# Patient Record
Sex: FEMALE | Race: BLACK OR AFRICAN AMERICAN | NOT HISPANIC OR LATINO | URBAN - METROPOLITAN AREA
[De-identification: names, ages, dates, MRNs, and addresses within clinical notes are randomized per-mention and may not be internally consistent; named-entity substitution may affect disease eponyms.]

---

## 2020-06-25 ENCOUNTER — LAB OUTSIDE AN ENCOUNTER (OUTPATIENT)
Dept: URBAN - METROPOLITAN AREA CLINIC 70 | Facility: CLINIC | Age: 40
End: 2020-06-25

## 2020-06-25 ENCOUNTER — OFFICE VISIT (OUTPATIENT)
Dept: URBAN - METROPOLITAN AREA CLINIC 70 | Facility: CLINIC | Age: 40
End: 2020-06-25
Payer: OTHER GOVERNMENT

## 2020-06-25 DIAGNOSIS — R14.0 BLOATING: ICD-10-CM

## 2020-06-25 DIAGNOSIS — K58.1 IRRITABLE BOWEL SYNDROME WITH CONSTIPATION: ICD-10-CM

## 2020-06-25 DIAGNOSIS — R13.10 DYSPHAGIA, UNSPECIFIED TYPE: ICD-10-CM

## 2020-06-25 DIAGNOSIS — R10.13 EPIGASTRIC ABDOMINAL PAIN: ICD-10-CM

## 2020-06-25 PROCEDURE — 1036F TOBACCO NON-USER: CPT | Performed by: NURSE PRACTITIONER

## 2020-06-25 PROCEDURE — G8420 CALC BMI NORM PARAMETERS: HCPCS | Performed by: NURSE PRACTITIONER

## 2020-06-25 PROCEDURE — G8427 DOCREV CUR MEDS BY ELIG CLIN: HCPCS | Performed by: NURSE PRACTITIONER

## 2020-06-25 PROCEDURE — 99213 OFFICE O/P EST LOW 20 MIN: CPT | Performed by: NURSE PRACTITIONER

## 2020-06-25 NOTE — HPI-TODAY'S VISIT:
C/o of a "pulsing" sensation that has been intermittent for years.  It appears to be worse after eating and lasts mins before resolving on its on.  Denies use of medication to help alleivate this.  May feel as if her heart is racing with this sensation.   has undergone cardiology evaluation in regards to this about 1-2 years ago with negative findings.   States about 2x a week, feels that her food is stopping in the epigastric region. This too resolves on its on.  Denies increase in frequency.     Has a hx of IBS-C.  Was last seen in [place] office 05/2018 for similar complaints.  Trial of Linzess, Trulance and Trazadone discussed.  Defecation occurs about qod but she fails to feel emptied.  Takes Flax seed oil or hot tea.  She has a hx of anxiety but denies use of medication for this as well.

## 2020-06-25 NOTE — PHYSICAL EXAM CHEST:
chest wall non-tender,  no masses present, breathing is unlabored without accessory muscle use, normal breath sounds

## 2020-06-25 NOTE — PHYSICAL EXAM GASTROINTESTINAL
Abdomen , soft, epigastric, nondistended , no guarding or rigidity , no masses palpable , normal bowel sounds , Liver and Spleen , no hepatomegaly present , no hepatosplenomegaly , liver nontender , spleen not palpable

## 2020-07-09 ENCOUNTER — CLAIMS CREATED FROM THE CLAIM WINDOW (OUTPATIENT)
Dept: URBAN - METROPOLITAN AREA CLINIC 4 | Facility: CLINIC | Age: 40
End: 2020-07-09
Payer: OTHER GOVERNMENT

## 2020-07-09 ENCOUNTER — OFFICE VISIT (OUTPATIENT)
Dept: URBAN - METROPOLITAN AREA SURGERY CENTER 24 | Facility: SURGERY CENTER | Age: 40
End: 2020-07-09
Payer: OTHER GOVERNMENT

## 2020-07-09 DIAGNOSIS — B96.81 HELICOBACTER PYLORI [H. PYLORI] AS THE CAUSE OF DISEASES CLASSIFIED ELSEWHERE: ICD-10-CM

## 2020-07-09 DIAGNOSIS — K29.60 ADENOPAPILLOMATOSIS GASTRICA: ICD-10-CM

## 2020-07-09 DIAGNOSIS — K21.9 ACID REFLUX: ICD-10-CM

## 2020-07-09 DIAGNOSIS — K21.0 GASTRO-ESOPHAGEAL REFLUX DISEASE WITH ESOPHAGITIS: ICD-10-CM

## 2020-07-09 DIAGNOSIS — B96.81 BACTERIAL INFECTION DUE TO H. PYLORI: ICD-10-CM

## 2020-07-09 PROCEDURE — 88342 IMHCHEM/IMCYTCHM 1ST ANTB: CPT | Performed by: PATHOLOGY

## 2020-07-09 PROCEDURE — 88305 TISSUE EXAM BY PATHOLOGIST: CPT | Performed by: PATHOLOGY

## 2020-07-09 PROCEDURE — 43239 EGD BIOPSY SINGLE/MULTIPLE: CPT | Performed by: INTERNAL MEDICINE

## 2020-07-09 PROCEDURE — G8907 PT DOC NO EVENTS ON DISCHARG: HCPCS | Performed by: INTERNAL MEDICINE

## 2020-07-10 ENCOUNTER — TELEPHONE ENCOUNTER (OUTPATIENT)
Dept: URBAN - METROPOLITAN AREA CLINIC 92 | Facility: CLINIC | Age: 40
End: 2020-07-10

## 2020-07-10 RX ORDER — SUCRALFATE 1 G/1
1 TABLET ON AN EMPTY STOMACH TABLET ORAL TWICE A DAY
Qty: 60 TABLET | Refills: 2 | OUTPATIENT
Start: 2020-07-10 | End: 2020-10-08

## 2020-07-10 RX ORDER — OMEPRAZOLE 40 MG/1
1 CAPSULE 30 MINUTES BEFORE MORNING MEAL CAPSULE, DELAYED RELEASE ORAL ONCE A DAY
Qty: 60 | Refills: 1 | OUTPATIENT
Start: 2020-07-10

## 2020-07-10 RX ORDER — SUCRALFATE 1 G/1
1 TABLET ON AN EMPTY STOMACH TABLET ORAL TWICE A DAY
Qty: 120 | Refills: 1 | OUTPATIENT
Start: 2020-07-10 | End: 2020-11-06

## 2021-04-20 ENCOUNTER — TELEPHONE ENCOUNTER (OUTPATIENT)
Dept: URBAN - METROPOLITAN AREA CLINIC 23 | Facility: CLINIC | Age: 41
End: 2021-04-20

## 2021-05-14 ENCOUNTER — OFFICE VISIT (OUTPATIENT)
Dept: URBAN - METROPOLITAN AREA CLINIC 70 | Facility: CLINIC | Age: 41
End: 2021-05-14

## 2021-05-14 RX ORDER — OMEPRAZOLE 40 MG/1
1 CAPSULE 30 MINUTES BEFORE MORNING MEAL CAPSULE, DELAYED RELEASE ORAL ONCE A DAY
Qty: 60 | Refills: 1 | Status: ACTIVE | COMMUNITY
Start: 2020-07-10

## 2024-01-31 ENCOUNTER — OFFICE VISIT (OUTPATIENT)
Dept: URBAN - METROPOLITAN AREA CLINIC 88 | Facility: CLINIC | Age: 44
End: 2024-01-31

## 2024-01-31 ENCOUNTER — LAB OUTSIDE AN ENCOUNTER (OUTPATIENT)
Dept: URBAN - METROPOLITAN AREA CLINIC 88 | Facility: CLINIC | Age: 44
End: 2024-01-31

## 2024-01-31 ENCOUNTER — OFFICE VISIT (OUTPATIENT)
Dept: URBAN - METROPOLITAN AREA CLINIC 88 | Facility: CLINIC | Age: 44
End: 2024-01-31
Payer: OTHER GOVERNMENT

## 2024-01-31 VITALS
BODY MASS INDEX: 25.02 KG/M2 | OXYGEN SATURATION: 99 % | DIASTOLIC BLOOD PRESSURE: 72 MMHG | TEMPERATURE: 97.7 F | SYSTOLIC BLOOD PRESSURE: 103 MMHG | HEART RATE: 109 BPM | HEIGHT: 65 IN | WEIGHT: 150.2 LBS

## 2024-01-31 DIAGNOSIS — K21.00 GASTROESOPHAGEAL REFLUX DISEASE WITH ESOPHAGITIS WITHOUT HEMORRHAGE: ICD-10-CM

## 2024-01-31 DIAGNOSIS — R14.0 POSTPRANDIAL BLOATING: ICD-10-CM

## 2024-01-31 DIAGNOSIS — K59.09 CHANGE IN BOWEL MOVEMENTS INTERMITTENT CONSTIPATION. URGENCY IN THE MORNING.: ICD-10-CM

## 2024-01-31 PROBLEM — 35298007: Status: ACTIVE | Noted: 2024-01-31

## 2024-01-31 PROBLEM — 266433003: Status: ACTIVE | Noted: 2024-01-31

## 2024-01-31 PROCEDURE — 99204 OFFICE O/P NEW MOD 45 MIN: CPT | Performed by: NURSE PRACTITIONER

## 2024-01-31 RX ORDER — OMEPRAZOLE 40 MG/1
1 CAPSULE 30 MINUTES BEFORE MORNING MEAL CAPSULE, DELAYED RELEASE ORAL ONCE A DAY
Qty: 60 | Refills: 1 | Status: ACTIVE | COMMUNITY
Start: 2020-07-10

## 2024-01-31 RX ORDER — PANTOPRAZOLE SODIUM 40 MG/1
1 TABLET TABLET, DELAYED RELEASE ORAL
Qty: 90 | Refills: 1 | OUTPATIENT
Start: 2024-01-31

## 2024-01-31 RX ORDER — OMEPRAZOLE 40 MG/1
1 CAPSULE 30 MINUTES BEFORE MORNING MEAL CAPSULE, DELAYED RELEASE ORAL ONCE A DAY
Qty: 60 | Refills: 1 | Status: DISCONTINUED | COMMUNITY
Start: 2020-07-10

## 2024-01-31 NOTE — HPI-TODAY'S VISIT:
43 y.o. presenting for evaluation of various GI complaints.  Currently c/o epigastric abdominal pain, constipation and gas/bloating that been an issue for years.  Feels her symptoms have increased over the last year.  Eating leads to early satiety, abdominal fullness and senation of food stopping in epigastric region.  Occasional nausea may occur.  Unable to identify specific dietary triggers.  Sensation lasts for minutes to hours.  Belching may alleviate this discomfort slightly.  Last EGD in 2020 revealed signs of reflux esophagitis and h. pylori gastritis.  Denies use of antacid therapy or symptoms of reflux occuring.    Voices long hx of constipation.  Typically defecation occurs once every 2-3 days.  Stools range from formed to loose.  May not experience a complete sense of evacuation of stool with every BM.  LLQ discomfort occurs and improves with defecation.

## 2024-01-31 NOTE — PHYSICAL EXAM GASTROINTESTINAL
Abdomen , soft, LLQ tenderness, nondistended , no guarding or rigidity , no masses palpable , normal bowel sounds , Liver and Spleen: no hepatosplenomegaly

## 2024-02-15 LAB — H PYLORI BREATH TEST: NEGATIVE

## 2024-03-14 ENCOUNTER — OV EP (OUTPATIENT)
Dept: URBAN - METROPOLITAN AREA CLINIC 88 | Facility: CLINIC | Age: 44
End: 2024-03-14

## 2024-03-14 RX ORDER — OMEPRAZOLE 40 MG/1
1 CAPSULE 30 MINUTES BEFORE MORNING MEAL CAPSULE, DELAYED RELEASE ORAL ONCE A DAY
Qty: 60 | Refills: 1 | Status: ACTIVE | COMMUNITY
Start: 2020-07-10

## 2024-03-14 RX ORDER — PANTOPRAZOLE SODIUM 40 MG/1
1 TABLET TABLET, DELAYED RELEASE ORAL
Qty: 90 | Refills: 1 | Status: ACTIVE | COMMUNITY
Start: 2024-01-31

## 2024-03-25 ENCOUNTER — OV EP (OUTPATIENT)
Dept: URBAN - METROPOLITAN AREA CLINIC 88 | Facility: CLINIC | Age: 44
End: 2024-03-25

## 2024-03-25 RX ORDER — OMEPRAZOLE 40 MG/1
1 CAPSULE 30 MINUTES BEFORE MORNING MEAL CAPSULE, DELAYED RELEASE ORAL ONCE A DAY
Qty: 60 | Refills: 1 | Status: ACTIVE | COMMUNITY
Start: 2020-07-10

## 2024-03-25 RX ORDER — PANTOPRAZOLE SODIUM 40 MG/1
1 TABLET TABLET, DELAYED RELEASE ORAL
Qty: 90 | Refills: 1 | Status: ACTIVE | COMMUNITY
Start: 2024-01-31

## 2024-04-10 ENCOUNTER — OV EP (OUTPATIENT)
Dept: URBAN - METROPOLITAN AREA CLINIC 88 | Facility: CLINIC | Age: 44
End: 2024-04-10
Payer: COMMERCIAL

## 2024-04-10 VITALS
WEIGHT: 152.2 LBS | SYSTOLIC BLOOD PRESSURE: 146 MMHG | TEMPERATURE: 97.9 F | DIASTOLIC BLOOD PRESSURE: 74 MMHG | BODY MASS INDEX: 25.36 KG/M2 | HEART RATE: 105 BPM | HEIGHT: 65 IN | OXYGEN SATURATION: 100 %

## 2024-04-10 DIAGNOSIS — K21.00 GASTROESOPHAGEAL REFLUX DISEASE WITH ESOPHAGITIS WITHOUT HEMORRHAGE: ICD-10-CM

## 2024-04-10 DIAGNOSIS — K59.00 COLONIC CONSTIPATION: ICD-10-CM

## 2024-04-10 DIAGNOSIS — R14.0 POSTPRANDIAL BLOATING: ICD-10-CM

## 2024-04-10 PROCEDURE — 99213 OFFICE O/P EST LOW 20 MIN: CPT | Performed by: NURSE PRACTITIONER

## 2024-04-10 RX ORDER — OMEPRAZOLE 40 MG/1
1 CAPSULE 30 MINUTES BEFORE MORNING MEAL CAPSULE, DELAYED RELEASE ORAL ONCE A DAY
Qty: 60 | Refills: 1 | Status: DISCONTINUED | COMMUNITY
Start: 2020-07-10

## 2024-04-10 RX ORDER — PANTOPRAZOLE SODIUM 40 MG/1
1 TABLET TABLET, DELAYED RELEASE ORAL
Qty: 90 | Refills: 1 | OUTPATIENT

## 2024-04-10 RX ORDER — PANTOPRAZOLE SODIUM 40 MG/1
1 TABLET TABLET, DELAYED RELEASE ORAL
Qty: 90 | Refills: 1 | Status: DISCONTINUED | COMMUNITY
Start: 2024-01-31

## 2024-04-10 NOTE — HPI-TODAY'S VISIT:
Patient presents today for follow up.  Since LOV, has been trying to adhere to daily use of Nexium OTC on daily basis. Has also been adhering to anti-reflux measures to manage reflux.  When symptoms do occur, still voices burning regurgitation and epigastric pain.  Occasional nausea w/o vomiting occurs, especially post-prandially.    Started taking magnesium-oxide powder to manage constipation.  Takes medication as needed for now since she feels that she is "going on my own".  5 day Sitz marker completed on 05/27/2024:  no Sitz Marker remaining with moderate stool burden.    Admits to increase levels of stress over the last week (become emotional talking about it), which she feels is leading to her increased blood pressure today.

## 2024-04-10 NOTE — HPI-OTHER HISTORIES
--------------------------------------------------------------- Office note 01/31/2024: 43 y.o. presenting for evaluation of various GI complaints.  Currently c/o epigastric abdominal pain, constipation and gas/bloating that been an issue for years.  Feels her symptoms have increased over the last year.  Eating leads to early satiety, abdominal fullness and senation of food stopping in epigastric region.  Occasional nausea may occur.  Unable to identify specific dietary triggers.  Sensation lasts for minutes to hours.  Belching may alleviate this discomfort slightly.  Last EGD in 2020 revealed signs of reflux esophagitis and h. pylori gastritis.  Denies use of antacid therapy or symptoms of reflux occuring.    Voices long hx of constipation.  Typically defecation occurs once every 2-3 days.  Stools range from formed to loose.  May not experience a complete sense of evacuation of stool with every BM.  LLQ discomfort occurs and improves with defecation.
Attending and PA/NP shared services statement (NON-critical care):

## 2024-06-04 ENCOUNTER — DASHBOARD ENCOUNTERS (OUTPATIENT)
Age: 44
End: 2024-06-04

## 2024-06-10 ENCOUNTER — OFFICE VISIT (OUTPATIENT)
Dept: URBAN - METROPOLITAN AREA CLINIC 88 | Facility: CLINIC | Age: 44
End: 2024-06-10

## 2024-06-10 RX ORDER — PANTOPRAZOLE SODIUM 40 MG/1
1 TABLET TABLET, DELAYED RELEASE ORAL
Qty: 90 | Refills: 1 | Status: ACTIVE | COMMUNITY

## 2024-09-06 ENCOUNTER — OFFICE VISIT (OUTPATIENT)
Dept: URBAN - METROPOLITAN AREA CLINIC 118 | Facility: CLINIC | Age: 44
End: 2024-09-06
Payer: COMMERCIAL

## 2024-09-06 VITALS
HEART RATE: 87 BPM | BODY MASS INDEX: 24.72 KG/M2 | SYSTOLIC BLOOD PRESSURE: 125 MMHG | WEIGHT: 148.4 LBS | HEIGHT: 65 IN | DIASTOLIC BLOOD PRESSURE: 77 MMHG | TEMPERATURE: 97.5 F

## 2024-09-06 DIAGNOSIS — K58.1 IRRITABLE BOWEL SYNDROME WITH CONSTIPATION: ICD-10-CM

## 2024-09-06 PROCEDURE — 99214 OFFICE O/P EST MOD 30 MIN: CPT | Performed by: INTERNAL MEDICINE

## 2024-09-06 RX ORDER — PANTOPRAZOLE SODIUM 40 MG/1
1 TABLET TABLET, DELAYED RELEASE ORAL
Qty: 90 | Refills: 1 | Status: ACTIVE | COMMUNITY

## 2024-09-06 NOTE — HPI-TODAY'S VISIT:
9/6/24 - Pt here for follow up.  BMs irregular, from constipation to daily.  Has periods of SHAWNA with a sense of fullness and constipation lasting ~ 1 wk.  Does have increased stress at work.  Weight stable. - - - - - - -- 4/2024 - Patient presents today for follow up.  Since LOV, has been trying to adhere to daily use of Nexium OTC on daily basis. Has also been adhering to anti-reflux measures to manage reflux.  When symptoms do occur, still voices burning regurgitation and epigastric pain.  Occasional nausea w/o vomiting occurs, especially post-prandially.    Started taking magnesium-oxide powder to manage constipation.  Takes medication as needed for now since she feels that she is "going on my own".  5 day Sitz marker completed on 05/27/2024:  no Sitz Marker remaining with moderate stool burden.    Admits to increase levels of stress over the last week (become emotional talking about it), which she feels is leading to her increased blood pressure today.